# Patient Record
Sex: MALE | Race: WHITE | Employment: FULL TIME | URBAN - METROPOLITAN AREA
[De-identification: names, ages, dates, MRNs, and addresses within clinical notes are randomized per-mention and may not be internally consistent; named-entity substitution may affect disease eponyms.]

---

## 2017-05-02 ENCOUNTER — TELEPHONE (OUTPATIENT)
Dept: NUTRITION | Age: 42
End: 2017-05-02

## 2017-05-02 NOTE — TELEPHONE ENCOUNTER
Nutrition Counseling: Called pt and left voicemail with contact information regarding Dr. Presley Ervin Jr.'s referral for nutrition counseling.     Asohk Acevedo, [address], [place]  Outpatient Dietitian  Office: [phone]  Cell: [phone]

## 2017-05-10 ENCOUNTER — TELEPHONE (OUTPATIENT)
Dept: NUTRITION | Age: 42
End: 2017-05-10

## 2017-05-10 NOTE — TELEPHONE ENCOUNTER
Nutrition Counseling: Called pt to f/u regarding Dr. Awan Ty Ty referral for nutrition counseling. Pt is interested, but stated he was unable to talk at this time and had been planning to call RD back. Pt would like insurance checked, and RD will email pt with details of nutrition counseling program for reference. Will email/call pt when insurance check is complete.     Agnes Bardales, [address] [place] [address],   Outpatient Dietitian  Office: [phone]  Cell: [phone]

## 2017-05-17 ENCOUNTER — DOCUMENTATION ONLY (OUTPATIENT)
Dept: NUTRITION | Age: 42
End: 2017-05-17

## 2017-05-17 NOTE — PROGRESS NOTES
Nutrition Counseling: Emailed pt as requested with insurance coverage information. Encouraged pt to contact RD to schedule appt when able.     Darshan Corrigan, [address],   Outpatient Dietitian  Office: [phone]  Cell: [phone]

## 2017-05-25 ENCOUNTER — TELEPHONE (OUTPATIENT)
Dept: NUTRITION | Age: 42
End: 2017-05-25

## 2017-05-25 NOTE — TELEPHONE ENCOUNTER
Nutrition Counseling: Called pt and left voicemail to f/u on insurance check coverage and email sent last week. Offered to answer any further questions and schedule pt when desired.     Ashok Acevedo, [address] [place],   Outpatient Dietitian  Office: [phone]  Cell: [phone]

## 2017-06-28 ENCOUNTER — DOCUMENTATION ONLY (OUTPATIENT)
Dept: NUTRITION | Age: 42
End: 2017-06-28

## 2017-06-28 NOTE — PROGRESS NOTES
Nutrition Counseling:  Pt has not returned call or contacted RD further. Will close referral for this office and notify referring physician.     Tyrell Adams, [address], [place]  Outpatient Dietitian  Office: [phone]  Cell: [phone]

## 2023-05-10 RX ORDER — AMLODIPINE BESYLATE 10 MG/1
1 TABLET ORAL DAILY
COMMUNITY
Start: 2019-01-28

## 2023-05-10 RX ORDER — LISINOPRIL 40 MG/1
1 TABLET ORAL DAILY
COMMUNITY
Start: 2018-08-02